# Patient Record
Sex: MALE | Race: WHITE | NOT HISPANIC OR LATINO | URBAN - METROPOLITAN AREA
[De-identification: names, ages, dates, MRNs, and addresses within clinical notes are randomized per-mention and may not be internally consistent; named-entity substitution may affect disease eponyms.]

---

## 2022-07-05 ENCOUNTER — EMERGENCY (EMERGENCY)
Facility: HOSPITAL | Age: 32
LOS: 1 days | Discharge: HOME | End: 2022-07-05
Attending: EMERGENCY MEDICINE
Payer: MEDICAID

## 2022-07-05 VITALS
HEIGHT: 61 IN | HEART RATE: 74 BPM | RESPIRATION RATE: 18 BRPM | WEIGHT: 134.92 LBS | OXYGEN SATURATION: 97 % | SYSTOLIC BLOOD PRESSURE: 126 MMHG | DIASTOLIC BLOOD PRESSURE: 79 MMHG | TEMPERATURE: 98 F

## 2022-07-05 VITALS
SYSTOLIC BLOOD PRESSURE: 118 MMHG | TEMPERATURE: 98 F | DIASTOLIC BLOOD PRESSURE: 78 MMHG | RESPIRATION RATE: 16 BRPM | HEART RATE: 61 BPM | OXYGEN SATURATION: 98 %

## 2022-07-05 DIAGNOSIS — F31.9 BIPOLAR DISORDER, UNSPECIFIED: ICD-10-CM

## 2022-07-05 DIAGNOSIS — F12.20 CANNABIS DEPENDENCE, UNCOMPLICATED: ICD-10-CM

## 2022-07-05 DIAGNOSIS — F41.9 ANXIETY DISORDER, UNSPECIFIED: ICD-10-CM

## 2022-07-05 LAB
ALBUMIN SERPL ELPH-MCNC: 4.9 G/DL — SIGNIFICANT CHANGE UP (ref 3.3–5)
ALP SERPL-CCNC: 73 U/L — SIGNIFICANT CHANGE UP (ref 40–120)
ALT FLD-CCNC: 22 U/L — SIGNIFICANT CHANGE UP (ref 10–45)
ANION GAP SERPL CALC-SCNC: 13 MMOL/L — SIGNIFICANT CHANGE UP (ref 5–17)
APPEARANCE UR: CLEAR — SIGNIFICANT CHANGE UP
AST SERPL-CCNC: 26 U/L — SIGNIFICANT CHANGE UP (ref 10–40)
BACTERIA # UR AUTO: NEGATIVE — SIGNIFICANT CHANGE UP
BASOPHILS # BLD AUTO: 0.03 K/UL — SIGNIFICANT CHANGE UP (ref 0–0.2)
BASOPHILS NFR BLD AUTO: 0.4 % — SIGNIFICANT CHANGE UP (ref 0–2)
BILIRUB SERPL-MCNC: 0.5 MG/DL — SIGNIFICANT CHANGE UP (ref 0.2–1.2)
BILIRUB UR-MCNC: NEGATIVE — SIGNIFICANT CHANGE UP
BUN SERPL-MCNC: 20 MG/DL — SIGNIFICANT CHANGE UP (ref 7–23)
CALCIUM SERPL-MCNC: 9.8 MG/DL — SIGNIFICANT CHANGE UP (ref 8.4–10.5)
CHLORIDE SERPL-SCNC: 101 MMOL/L — SIGNIFICANT CHANGE UP (ref 96–108)
CO2 SERPL-SCNC: 25 MMOL/L — SIGNIFICANT CHANGE UP (ref 22–31)
COLOR SPEC: COLORLESS — SIGNIFICANT CHANGE UP
CREAT SERPL-MCNC: 0.73 MG/DL — SIGNIFICANT CHANGE UP (ref 0.5–1.3)
DIFF PNL FLD: NEGATIVE — SIGNIFICANT CHANGE UP
EGFR: 126 ML/MIN/1.73M2 — SIGNIFICANT CHANGE UP
EOSINOPHIL # BLD AUTO: 0.08 K/UL — SIGNIFICANT CHANGE UP (ref 0–0.5)
EOSINOPHIL NFR BLD AUTO: 1.1 % — SIGNIFICANT CHANGE UP (ref 0–6)
EPI CELLS # UR: 1 /HPF — SIGNIFICANT CHANGE UP
ETHANOL SERPL-MCNC: <10 MG/DL — SIGNIFICANT CHANGE UP (ref 0–10)
GLUCOSE SERPL-MCNC: 114 MG/DL — HIGH (ref 70–99)
GLUCOSE UR QL: NEGATIVE — SIGNIFICANT CHANGE UP
HCT VFR BLD CALC: 41.7 % — SIGNIFICANT CHANGE UP (ref 39–50)
HGB BLD-MCNC: 14.7 G/DL — SIGNIFICANT CHANGE UP (ref 13–17)
HYALINE CASTS # UR AUTO: 2 /LPF — SIGNIFICANT CHANGE UP (ref 0–2)
IMM GRANULOCYTES NFR BLD AUTO: 0.3 % — SIGNIFICANT CHANGE UP (ref 0–1.5)
KETONES UR-MCNC: NEGATIVE — SIGNIFICANT CHANGE UP
LEUKOCYTE ESTERASE UR-ACNC: NEGATIVE — SIGNIFICANT CHANGE UP
LYMPHOCYTES # BLD AUTO: 2.8 K/UL — SIGNIFICANT CHANGE UP (ref 1–3.3)
LYMPHOCYTES # BLD AUTO: 39.2 % — SIGNIFICANT CHANGE UP (ref 13–44)
MCHC RBC-ENTMCNC: 30.9 PG — SIGNIFICANT CHANGE UP (ref 27–34)
MCHC RBC-ENTMCNC: 35.3 GM/DL — SIGNIFICANT CHANGE UP (ref 32–36)
MCV RBC AUTO: 87.8 FL — SIGNIFICANT CHANGE UP (ref 80–100)
MONOCYTES # BLD AUTO: 0.85 K/UL — SIGNIFICANT CHANGE UP (ref 0–0.9)
MONOCYTES NFR BLD AUTO: 11.9 % — SIGNIFICANT CHANGE UP (ref 2–14)
NEUTROPHILS # BLD AUTO: 3.37 K/UL — SIGNIFICANT CHANGE UP (ref 1.8–7.4)
NEUTROPHILS NFR BLD AUTO: 47.1 % — SIGNIFICANT CHANGE UP (ref 43–77)
NITRITE UR-MCNC: NEGATIVE — SIGNIFICANT CHANGE UP
NRBC # BLD: 0 /100 WBCS — SIGNIFICANT CHANGE UP (ref 0–0)
PH UR: 6.5 — SIGNIFICANT CHANGE UP (ref 5–8)
PLATELET # BLD AUTO: 428 K/UL — HIGH (ref 150–400)
POTASSIUM SERPL-MCNC: 3.6 MMOL/L — SIGNIFICANT CHANGE UP (ref 3.5–5.3)
POTASSIUM SERPL-SCNC: 3.6 MMOL/L — SIGNIFICANT CHANGE UP (ref 3.5–5.3)
PROT SERPL-MCNC: 7.4 G/DL — SIGNIFICANT CHANGE UP (ref 6–8.3)
PROT UR-MCNC: NEGATIVE — SIGNIFICANT CHANGE UP
RBC # BLD: 4.75 M/UL — SIGNIFICANT CHANGE UP (ref 4.2–5.8)
RBC # FLD: 11.4 % — SIGNIFICANT CHANGE UP (ref 10.3–14.5)
RBC CASTS # UR COMP ASSIST: 0 /HPF — SIGNIFICANT CHANGE UP (ref 0–4)
SARS-COV-2 RNA SPEC QL NAA+PROBE: SIGNIFICANT CHANGE UP
SODIUM SERPL-SCNC: 139 MMOL/L — SIGNIFICANT CHANGE UP (ref 135–145)
SP GR SPEC: 1.01 — LOW (ref 1.01–1.02)
TSH SERPL-MCNC: 0.85 UIU/ML — SIGNIFICANT CHANGE UP (ref 0.27–4.2)
UROBILINOGEN FLD QL: NEGATIVE — SIGNIFICANT CHANGE UP
WBC # BLD: 7.15 K/UL — SIGNIFICANT CHANGE UP (ref 3.8–10.5)
WBC # FLD AUTO: 7.15 K/UL — SIGNIFICANT CHANGE UP (ref 3.8–10.5)
WBC UR QL: 1 /HPF — SIGNIFICANT CHANGE UP (ref 0–5)

## 2022-07-05 PROCEDURE — 80053 COMPREHEN METABOLIC PANEL: CPT

## 2022-07-05 PROCEDURE — 93010 ELECTROCARDIOGRAM REPORT: CPT

## 2022-07-05 PROCEDURE — U0005: CPT

## 2022-07-05 PROCEDURE — 84443 ASSAY THYROID STIM HORMONE: CPT

## 2022-07-05 PROCEDURE — U0003: CPT

## 2022-07-05 PROCEDURE — 85025 COMPLETE CBC W/AUTO DIFF WBC: CPT

## 2022-07-05 PROCEDURE — 90792 PSYCH DIAG EVAL W/MED SRVCS: CPT

## 2022-07-05 PROCEDURE — 99284 EMERGENCY DEPT VISIT MOD MDM: CPT

## 2022-07-05 PROCEDURE — 36415 COLL VENOUS BLD VENIPUNCTURE: CPT

## 2022-07-05 PROCEDURE — 99285 EMERGENCY DEPT VISIT HI MDM: CPT | Mod: 25

## 2022-07-05 PROCEDURE — 81001 URINALYSIS AUTO W/SCOPE: CPT

## 2022-07-05 PROCEDURE — 80307 DRUG TEST PRSMV CHEM ANLYZR: CPT

## 2022-07-05 PROCEDURE — 93005 ELECTROCARDIOGRAM TRACING: CPT

## 2022-07-05 RX ORDER — NICOTINE POLACRILEX 2 MG
1 GUM BUCCAL DAILY
Refills: 0 | Status: DISCONTINUED | OUTPATIENT
Start: 2022-07-05 | End: 2022-07-08

## 2022-07-05 RX ADMIN — Medication 1 PATCH: at 12:33

## 2022-07-05 NOTE — BEHAVIORAL HEALTH ASSESSMENT NOTE - OTHER
Patient reports mood is labile with substance use Patient denies current SI or previous suicide attempts "Great" Disorganized, difficult to interpret, not pressured Believes staff is labeling him as manic by setting fire alarm off Supportive relationship with parents, has cat, is employed as psychologist Follows with psychiatrist outpatient, complaint with medication

## 2022-07-05 NOTE — BEHAVIORAL HEALTH ASSESSMENT NOTE - SUICIDE PROTECTIVE FACTORS
Supportive social network of family or friends/Engaged in work or school/Positive therapeutic relationships/Beloved pets Responsibility to family and others/Identifies reasons for living/Has future plans/Supportive social network of family or friends/Engaged in work or school/Positive therapeutic relationships/Beloved pets

## 2022-07-05 NOTE — BEHAVIORAL HEALTH ASSESSMENT NOTE - NSBHCHARTREVIEWVS_PSY_A_CORE FT
Vital Signs Last 24 Hrs  T(C): 36.8 (05 Jul 2022 07:59), Max: 36.8 (05 Jul 2022 07:59)  T(F): 98.2 (05 Jul 2022 07:59), Max: 98.2 (05 Jul 2022 07:59)  HR: 60 (05 Jul 2022 07:59) (60 - 74)  BP: 115/67 (05 Jul 2022 07:59) (115/67 - 126/79)  BP(mean): 79 (05 Jul 2022 07:59) (79 - 79)  RR: 14 (05 Jul 2022 07:59) (14 - 18)  SpO2: 99% (05 Jul 2022 07:59) (97% - 99%)

## 2022-07-05 NOTE — ED PROVIDER NOTE - NSFOLLOWUPINSTRUCTIONS_ED_ALL_ED_FT
--given that you were in the ED today, we recommend a followup visit with your psychiatrist within 7 days.   --your diagnosis is: bipolar disorder  --return to the ED if your current symptoms worsen, if thoughts of hurting self/others.

## 2022-07-05 NOTE — BEHAVIORAL HEALTH ASSESSMENT NOTE - DESCRIPTION (FIRST USE, LAST USE, QUANTITY, FREQUENCY, DURATION)
Unable to quantify Has a beer 1-2x monthly Unable to quantify, most recent use as of Thursday Unable to quantify during interview 1 vape, every 3-4 days

## 2022-07-05 NOTE — ED PROVIDER NOTE - PATIENT PORTAL LINK FT
You can access the FollowMyHealth Patient Portal offered by [facility] by registering at the following website: [email or web address]. By joining [facility] FollowMyHealth portal, you will also be able to view your health information using other applications (apps) compatible with our system.

## 2022-07-05 NOTE — BEHAVIORAL HEALTH ASSESSMENT NOTE - NSBHATTESTTYPEVISIT_PSY_A_CORE
Anesthesia Pre Eval Note    Anesthesia ROS/Med Hx        Anesthetic Complication History:  Patient does not have a history of anesthetic complications      Pulmonary Review:  History of: asthma - well controlled  The patient is a smoker with a 15 pack-year history.     Neuro/Psych Review:  Patient does not have a neuro/psych history       Cardiovascular Review:    Positive for hypertension - well controlled  Positive for hyperlipidemia    GI/HEPATIC/RENAL Review:    Positive for GERD - well controlled  Positive for renal disease - chronic renal insufficiency    End/Other Review:  Positive for diabetes - type 2  Positive for arthritis      Relevant Problems   No relevant active problems       Physical Exam     Airway   Mallampati: II  TM Distance: >3 FB  Neck ROM: Full  Neck: Able to place in sniff position  TMJ Mobility: Good    Cardiovascular  Cardiovascular exam normal  Cardio Rhythm: Regular  Cardio Rate: Normal    Head Assessment  Head assessment: Normocephalic    General Assessment  General Assessment: Alert and oriented and No acute distress    Dental Exam    Patient has:  Poor Dentition    Legend: C=Chipped  M=Missing  L=Loose B=Bridge Cr=Douglass I=Implant    Pulmonary Exam  Pulmonary exam normal    Abdominal Exam  Abdominal exam normal      Anesthesia Plan:  Anesthesia Plan  No phone call attempted due to:  ASA Status: 3    Anesthesia Type: General    Induction: Intravenous and RSI  Preferred Airway Type: ETT  Maintenance: Inhalational  Premedication: IV      Post-op Pain Management: Per Surgeon      Checklist  Reviewed: Past Med History, Allergies, Patient Summary, Lab Results, Medications, Nursing Notes, Problem list and NPO Status    Informed Consent  The proposed anesthetic plan, including its risks and benefits, have been discussed with the Patient  - along with the risks and benefits of alternatives.  Questions were encouraged and answered and the patient and/or representative understands and agrees to  proceed.     Blood Products: Not Anticipated    Comments  Plan Comments: I have discussed with the patient (and/or patient's legal representative) that they are an appropriate candidate for anesthesia.  I discussed with the patient (and/or patient's legal representative) the risks and benefits of the proposed anesthesia plan, which may include services performed by other practitioners under direct or indirect supervision of the Anesthesiologist.  Alternative anesthesia plans were reviewed with the patient (and/or patient's legal representative) including an emergent situation that may require a change in the anesthesia plan. The following risks have been discussed with the patient (and/or patient's legal representative):  dental and oropharyngeal trauma, cardiac and pulmonary complication, peripheral paresthesias, intraoperative awareness, adverse reactions to medication and post-operative nausea and vomiting.  The patient (and/or patient's legal representative) has indicated they understand, their questions have been answered, and they wish to proceed.          Consent/Risks Discussed Statement:  I have discussed with the patient, and/or patient's legal representative, the nature and purpose of anesthesia for the planned procedure. I have discussed elements of the patient's history or examination, as noted above and/or as follows, that place the patient at higher risk of complications - heart disease and smoking -    I discussed with the patient (and/or patient's legal representative) the risks and benefits of the proposed anesthesia plan, General, which may include services performed by other anesthesia providers.    Alternative anesthesia plans, if available, were reviewed with the patient (and/or patient's legal representative). Discussion has been held with the patient (and/or patient's legal representative) regarding risks of anesthesia, which include emergent situations that may require change in anesthesia  plan, as well as the following: Nausea, Vomiting, Sore Throat, Dental Injury, Hypotension, Allergic Reaction, Aspiration, Intra-operative Awareness and hypotension    The patient (and/or patient's legal representative) has indicated understanding, his/her questions have been answered, and he/she wishes to proceed with the planned anesthetic.     Attending with Resident/Fellow/Student

## 2022-07-05 NOTE — ED PROVIDER NOTE - PROGRESS NOTE DETAILS
sen attending- patient signed out to me pending psych eval, no medical complaints. will remain on constant observation. pending urine and ekg at this time of sign out Mely Clark MD, PGY-3: psych contacted, will see pt. Mely Clark MD, PGY-3: pt not accepted for admission by psych. verna osei pt.

## 2022-07-05 NOTE — BEHAVIORAL HEALTH ASSESSMENT NOTE - HPI (INCLUDE ILLNESS QUALITY, SEVERITY, DURATION, TIMING, CONTEXT, MODIFYING FACTORS, ASSOCIATED SIGNS AND SYMPTOMS)
Patient is a 30 yo male with a past known psychiatric history of BP 1 with psychotic features who presents to the ED for concerns of difficulty sleeping and hypomanic episode. [facility] psychiatry was consulted for concerns for hypomanic episode and hx fo BP I disorder.    Patient was seen and examined at bedside. Patient is AAOx4. Patient was cooperative and talkative throughout focused interview. During interview, patient needed to be redirected throughout focused interview and answers to questions during interview were disorganized. Patient states he has been experiencing difficulty sleeping since Thursday. He states he contacted psychologist to reschedule appointment last Tuesday. Psychologist provided patient with resources which he states that he contacted today. Patient was unsuccessful in reaching resources and thus called EMS and was brought to ED.  When asked about behavior control, patient states, today, he has attempted to contact women via social media and has written "some crazy things". Patient unable to articulate content of message. 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 Patient denies increased activity or energy, but does endorse difficulty with goal-directed activity at work. Patient denies current SI/HI, denies visual hallucinations, but does endorses auditory hallucinations. Patient states that he hears "house creaking" and the voices of his grandmother, grandfather, mother and father. As per patient, voices were not verbalizing commands and said that "tell them". Patient states voices are triggered by "good and bad decisions". Patient denies depressive symptoms. He describes his mood as "great". Denies anhedonia, psychomotor agitation, or decreased energy. Patient is a 30 yo male with a past known psychiatric history of BP 1 with psychotic features who presents to the ED for concerns of difficulty sleeping and hypomanic episode. [facility] psychiat[facility] was consulted for concerns for hypomanic episode and hx fo BP I disorder.    Patient was seen and examined at bedside. Patient is AAOx4. Patient was cooperative and talkative throughout focused interview. During interview, patient needed to be redirected throughout focused interview and answers to questions during interview were disorganized. Patient states he has been experiencing difficulty sleeping since Thursday. He states he contacted psychologist to reschedule appointment last Tuesday. Psychologist provided patient with resources which he states that he contacted today. Patient was unsuccessful in reaching resources and thus called EMS and was brought to ED.  When asked about behavior control, patient states, today, he has attempted to contact women via social media and has written "some crazy things". Patient unable to articulate content of message. 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 Patient denies increased activity or energy, but does endorse difficulty with goal-directed activity at work. Patient denies current SI/HI, denies visual hallucinations, but does endorses auditory hallucinations. Patient states that he hears "house creaking" and the voices of his grandmother, grandfather, mother and father. As per patient, voices were not verbalizing commands and said that "tell them". Patient states voices are triggered by "good and bad decisions". Patient denies depressive symptoms. He describes his mood as "great". Denies anhedonia, psychomotor agitation, or decreased energy.    Patient was reassessed in the ED. Patient's describes his mood as "irritated". Patient appears to have declining insight into condition. He states that "he just wants to go home" and that staff "is making him out to be manic", although patient contacted EMS for his own concerns.    Attempted to contact psychiatrist, Dr. Tripp Heaton, [phone], unable to reach psychiatrist, left VM with contact information. Patient is a 30 yo male with a past known psychiatric history of BP 1 with psychotic features who presents to the ED for concerns of difficulty sleeping and hypomanic episode. [facility] was consulted for concerns for manic behavior and hx fo BP I disorder.    Patient was seen and examined at bedside. Patient is AAOx4. Patient was cooperative and talkative throughout focused interview. During interview, patient needed to be redirected throughout focused interview and answers to questions during interview were disorganized. Patient states he has been experiencing difficulty sleeping since Thursday. He states he contacted psychologist to reschedule appointment last Tuesday. Psychologist provided patient with resources which he states that he contacted today. Patient was unsuccessful in reaching resources and thus called EMS and was brought to ED.  When asked about behavior control, patient states, today, he has attempted to contact women via social media and has written "some crazy things". Patient unable to articulate content of message. 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 Patient denies increased activity or energy, but does endorse difficulty with goal-directed activity at work. Patient denies current SI/HI, denies visual hallucinations, but does endorses auditory hallucinations. Patient states that he hears "house creaking" and the voices of his grandmother, grandfather, mother and father. As per patient, voices were not verbalizing commands and said that "tell them". Patient states voices are triggered by "good and bad decisions". Patient denies depressive symptoms. He describes his mood as "great". Denies anhedonia, psychomotor agitation, or decreased energy. Patient reports that he has been complaint with medication regimen.     Patient was reassessed in the ED. Patient's describes his mood as "irritated". Patient appears to have declining insight into condition. He states that "he just wants to go home" and that staff "is making him out to be manic", although patient contacted EMS for his own concerns.    Contacted psychiatrist, Dr. Tripp Heaton, [phone] for collateral. As per psychiatrist, last appointment with patient was last week. Patient stated that "he felt more manicky" and "sleeping later". Patient reported to psychiatrist that he normally is able to get 8 hours of sleep. Stated concerns for alcohol and cannabis use disorder. Dr. Port, adjusted his medications to Lamictal 300mg and Lexapro 5mg. He did not express any concerns for safety at this time.

## 2022-07-05 NOTE — ED PROVIDER NOTE - PHYSICAL EXAMINATION
CONSTITUTIONAL: Well appearing, well nourished, awake, alert, oriented to person, place, time/situation and in no apparent distress  ENMT: Airway patent  EYES: Clear bilaterally  CARDIAC: Normal rate, regular rhythm.  RESPIRATORY: Breath sounds clear and equal bilaterally.  ABDOMEN: Abdomen soft, non-tender, no guarding  MUSCULOSKELETAL: Spine appears normal, no deformities, equal active FROM bilaterally  NEUROLOGIC: CN II-XII grossly intact, moves all extremities without lateralization  SKIN: Exposed skin normal color for race, warm, dry and intact  PSYCH: cooperative, but very verbose in responses

## 2022-07-05 NOTE — ED PROVIDER NOTE - OBJECTIVE STATEMENT
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 h/o psychiatric admissions as a child. For past week patient has had insomnia, racing thoughts, praying to his ancestors and hearing knocks on wall as acknowledgement of his prayers, seeing women and concluding rapidly they "are into him", also using marijuana and marijuana edibles (last use was 3 days ago). Pt tried calling 311 but was put on hold. Tried calling his psychiatrist but didn't get callback. Called 911 on himself. Also reports there is something "embarrassing" happening but declines to share with examiner. Pt drinks alcohol daily after work (a beer or two). No physical complaints.    Mother "Jaqueline" [phone]

## 2022-07-05 NOTE — BEHAVIORAL HEALTH ASSESSMENT NOTE - VIOLENCE PROTECTIVE FACTORS:
Employment stability/Engagement in treatment Residential stability/Employment stability/Engagement in treatment

## 2022-07-05 NOTE — ED ADULT NURSE NOTE - OBJECTIVE STATEMENT
31y Male PMHx bipolar BIBEMS from home for a psych eval. As per EMS, pt states he called in EMS for himself because he "needs help." Upon assessment, pt states he is in a state of "hypomania" when asked what he means by this patient proceeds to speak on a tangent about feeling the urge to smoke weed again and take edibles. 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 Pt states weed makes him feel like he is a god. Pt endorses auditory hallucinations, pt states he prays to different saints and sometimes he hears them knock on his wall and he takes it as a sign. Pt has required psychiatric hospitalizations before but states "that is when I was younger, I know how to control it now." Pt denies SI/HI but states he knows he can become irritable and get into verbal/written altercations with people especially through reddit. Pt states "I just want to talk to a psychologist or psychiatrist." Pt endorses drinking white claws everyday and endorses consuming edibles however states he hasn't smoked marijuana in a couple of months. Pt compliant with medical work-up however when belongings/valuables were being secured he requested to see where we put his belongings and paperwork. Labs drawn and sent, seen and eval by MD. 31y Male PMHx bipolar BIBEMS from home for a psych eval. As per EMS, pt states he called in EMS for himself because he "needs help." Upon assessment, pt states he is in a state of "hypomania" when asked what he means by this patient proceeds to speak on a tangent about feeling the urge to smoke weed again and take edibles. 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 Pt states weed makes him feel like he is a god. Pt endorses auditory hallucinations, pt states he prays to different saints and sometimes he hears them knock on his wall and he takes it as a sign. Pt has required psychiatric hospitalizations before but states "that is when I was younger, I know how to control it now." Pt denies SI/HI but states he knows he can become irritable and get into verbal/written altercations with people especially through reddit. Pt states "I just want to talk to a psychologist or psychiatrist." Pt endorses drinking white claws everyday and endorses consuming edibles however states he hasn't smoked marijuana in a couple of months. Pt compliant with medical work-up however when belongings/valuables were being secured he requested to see where we put his belongings and paperwork. Belongings secured with security and valuables sealed in envelope #[id number] Labs drawn and sent, seen and eval by MD.

## 2022-07-05 NOTE — BEHAVIORAL HEALTH ASSESSMENT NOTE - NSBHCHARTREVIEWINVESTIGATE_PSY_A_CORE FT
· EKG Date/Time: 05-Jul-2022 08:32  · Rate: 68  · Interpretation: normal  · Axis: Normal  · OH: 148  · QRS: 84  · ST/T Wave: no ischemic findings  · Prior EKG Status: there is no prior EKG available for comparison

## 2022-07-05 NOTE — BEHAVIORAL HEALTH ASSESSMENT NOTE - DETAILS
Patient denies suicidal ideations or previous attempts Great grandmother- Bipolar disorder Haldol- acute dystonia

## 2022-07-05 NOTE — BEHAVIORAL HEALTH ASSESSMENT NOTE - PAST PSYCHOTROPIC MEDICATION
Lamotrigine 200mg   Lexapro 10 mg     Medication was changed by psychiatrist last Wednesday to:   Lamotrigine 300mg, Lexapro 5mg Lamotrigine 200mg   Lexapro 10 mg     Medication was changed by psychiatrist last Wednesday by psychiatrist:   Lamotrigine 300mg, Lexapro 5mg

## 2022-07-05 NOTE — BEHAVIORAL HEALTH ASSESSMENT NOTE - NSBHATTESTCOMMENTATTENDFT_PSY_A_CORE
Patient is a 30 yo male with a past known psychiatric history of BP 1 with psychotic features who presents to the ED for concerns of difficulty sleeping and hypomanic episode. [facility] psychiat[facility] was consulted for concerns for manic behavior and hx fo BP I disorder.    Patient was seen and examined at bedside. Patient is AAOx4. Patient was cooperative and talkative throughout focused interview. During interview, patient needed to be redirected throughout focused interview and answers to questions during interview were disorganized. Patient states he has been experiencing difficulty sleeping since Thursday. denies si/hi. some mild manic symptoms. can be d/c home, f/u with outpt psychiatrist, cont current meds

## 2022-07-05 NOTE — BEHAVIORAL HEALTH ASSESSMENT NOTE - RISK ASSESSMENT
Low Acute Suicide Risk At this time, patient does not appear to at imminent risk of harming himself, others or property and is therefore appropriate for supportive therapy and continued outpatient treatment with psychiatrist. Risk factors included reported cannabis use disorder, insomnia. and difficulty with impulsive behavior control.  Protective factors include family and employment.

## 2022-07-05 NOTE — BEHAVIORAL HEALTH ASSESSMENT NOTE - SUMMARY
Patient is a 32 yo male with a past known psychiatric history of BP 1 with psychotic features who presents to the ED for concerns of difficulty sleeping and hypomanic episode. [facility] was consulted for concerns for hypomanic episode and hx fo BP I disorder. Patient is a 30 yo male with a past known psychiatric history of BP 1 with psychotic features who presents to the ED for concerns of difficulty sleeping and manic behavior. [facility] was consulted for concerns for manic behavior and hx fo BP I disorder.    Patient describes increased difficulty with sleep. Upon evaluation, patient appears distracted and, at times, irritable. His speech is poorly articulated and loud. Patient is talkative and speech is circumferential and disorganized. Patient expresses grandiose thoughts of self. Psychotic symptoms are expressed during interview as patient endorses auditory hallucinations. Denies SI/HI and visual hallucinations.

## 2022-07-14 LAB
AMPHET UR-MCNC: NEGATIVE — SIGNIFICANT CHANGE UP
BARBITURATES, URINE.: NEGATIVE — SIGNIFICANT CHANGE UP
BENZODIAZ UR-MCNC: NEGATIVE — SIGNIFICANT CHANGE UP
COCAINE METAB.OTHER UR-MCNC: NEGATIVE — SIGNIFICANT CHANGE UP
CREATININE, URINE THERAPEUTIC: 34.8 MG/DL — SIGNIFICANT CHANGE UP
METHADONE UR-MCNC: NEGATIVE — SIGNIFICANT CHANGE UP
METHAQUALONE UR QL: NEGATIVE — SIGNIFICANT CHANGE UP
METHAQUALONE UR-MCNC: NEGATIVE — SIGNIFICANT CHANGE UP
OPIATES UR-MCNC: NEGATIVE — SIGNIFICANT CHANGE UP
PCP UR-MCNC: NEGATIVE — SIGNIFICANT CHANGE UP
PROPOXYPH UR QL: NEGATIVE — SIGNIFICANT CHANGE UP
THC UR QL CFM: 15 NG/ML — SIGNIFICANT CHANGE UP
THC UR QL: SIGNIFICANT CHANGE UP